# Patient Record
Sex: FEMALE | Race: WHITE | Employment: FULL TIME | URBAN - METROPOLITAN AREA
[De-identification: names, ages, dates, MRNs, and addresses within clinical notes are randomized per-mention and may not be internally consistent; named-entity substitution may affect disease eponyms.]

---

## 2017-04-05 PROCEDURE — 81001 URINALYSIS AUTO W/SCOPE: CPT | Performed by: INTERNAL MEDICINE

## 2017-04-05 PROCEDURE — 82043 UR ALBUMIN QUANTITATIVE: CPT | Performed by: INTERNAL MEDICINE

## 2017-04-05 PROCEDURE — 82570 ASSAY OF URINE CREATININE: CPT | Performed by: INTERNAL MEDICINE

## 2017-04-05 PROCEDURE — 87086 URINE CULTURE/COLONY COUNT: CPT | Performed by: INTERNAL MEDICINE

## 2017-04-27 PROBLEM — E66.01 MORBID OBESITY DUE TO EXCESS CALORIES (HCC): Status: ACTIVE | Noted: 2017-04-27

## 2018-02-19 PROCEDURE — 82043 UR ALBUMIN QUANTITATIVE: CPT | Performed by: INTERNAL MEDICINE

## 2018-02-19 PROCEDURE — 81003 URINALYSIS AUTO W/O SCOPE: CPT | Performed by: INTERNAL MEDICINE

## 2018-02-19 PROCEDURE — 82570 ASSAY OF URINE CREATININE: CPT | Performed by: INTERNAL MEDICINE

## 2018-06-07 PROCEDURE — 82785 ASSAY OF IGE: CPT | Performed by: INTERNAL MEDICINE

## 2018-06-18 PROBLEM — K21.9 LARYNGOPHARYNGEAL REFLUX (LPR): Status: ACTIVE | Noted: 2018-06-18

## 2018-07-09 PROCEDURE — 82570 ASSAY OF URINE CREATININE: CPT | Performed by: INTERNAL MEDICINE

## 2018-07-09 PROCEDURE — 81003 URINALYSIS AUTO W/O SCOPE: CPT | Performed by: INTERNAL MEDICINE

## 2018-07-09 PROCEDURE — 82043 UR ALBUMIN QUANTITATIVE: CPT | Performed by: INTERNAL MEDICINE

## 2019-01-16 PROCEDURE — 82570 ASSAY OF URINE CREATININE: CPT | Performed by: INTERNAL MEDICINE

## 2019-01-16 PROCEDURE — 82043 UR ALBUMIN QUANTITATIVE: CPT | Performed by: INTERNAL MEDICINE

## 2019-07-19 PROCEDURE — 87624 HPV HI-RISK TYP POOLED RSLT: CPT | Performed by: OBSTETRICS & GYNECOLOGY

## 2019-07-19 PROCEDURE — 88175 CYTOPATH C/V AUTO FLUID REDO: CPT | Performed by: OBSTETRICS & GYNECOLOGY

## 2020-05-18 PROBLEM — M25.552 HIP PAIN, ACUTE, LEFT: Status: ACTIVE | Noted: 2019-10-17

## 2020-05-26 NOTE — PAT NURSING NOTE
Spoke with [address] in Dr. Celso Felty office regarding abnormal Ua done 5-. Patient has an appt today with physician, will address at that time.

## 2020-05-29 NOTE — DISCHARGE SUMMARY
Ortho Discharge Summary     Patient ID:  Irving Arteaga  [id number]  39 year old  8/2/1965      Admit Date: 6/1/2020 10:19 AM    Discharge Date and Time: 6/2/2020  2:14 PM    Attending Physician: Vince Hardin MD     Reason for admission: left hi

## 2020-06-01 ENCOUNTER — HOSPITAL ENCOUNTER (OUTPATIENT)
Facility: HOSPITAL | Age: 55
Discharge: HOME | End: 2020-06-02
Attending: ORTHOPAEDIC SURGERY | Admitting: ORTHOPAEDIC SURGERY
Payer: COMMERCIAL

## 2020-06-01 ENCOUNTER — APPOINTMENT (OUTPATIENT)
Dept: GENERAL RADIOLOGY | Facility: HOSPITAL | Age: 55
End: 2020-06-01
Attending: PHYSICIAN ASSISTANT
Payer: COMMERCIAL

## 2020-06-01 ENCOUNTER — ANESTHESIA (OUTPATIENT)
Dept: SURGERY | Facility: HOSPITAL | Age: 55
End: 2020-06-01
Payer: COMMERCIAL

## 2020-06-01 ENCOUNTER — ANESTHESIA EVENT (OUTPATIENT)
Dept: SURGERY | Facility: HOSPITAL | Age: 55
End: 2020-06-01
Payer: COMMERCIAL

## 2020-06-01 PROBLEM — Z96.642 S/P HIP REPLACEMENT, LEFT: Status: ACTIVE | Noted: 2020-06-01

## 2020-06-01 PROBLEM — Z96.649 HISTORY OF HIP REPLACEMENT: Status: ACTIVE | Noted: 2020-06-01

## 2020-06-01 PROCEDURE — 97116 GAIT TRAINING THERAPY: CPT

## 2020-06-01 PROCEDURE — 86850 RBC ANTIBODY SCREEN: CPT | Performed by: ORTHOPAEDIC SURGERY

## 2020-06-01 PROCEDURE — 86901 BLOOD TYPING SEROLOGIC RH(D): CPT | Performed by: ORTHOPAEDIC SURGERY

## 2020-06-01 PROCEDURE — 97162 PT EVAL MOD COMPLEX 30 MIN: CPT

## 2020-06-01 PROCEDURE — 88311 DECALCIFY TISSUE: CPT | Performed by: ORTHOPAEDIC SURGERY

## 2020-06-01 PROCEDURE — 85018 HEMOGLOBIN: CPT | Performed by: PHYSICIAN ASSISTANT

## 2020-06-01 PROCEDURE — 82962 GLUCOSE BLOOD TEST: CPT

## 2020-06-01 PROCEDURE — 80053 COMPREHEN METABOLIC PANEL: CPT | Performed by: PHYSICIAN ASSISTANT

## 2020-06-01 PROCEDURE — 86900 BLOOD TYPING SEROLOGIC ABO: CPT | Performed by: ORTHOPAEDIC SURGERY

## 2020-06-01 PROCEDURE — 36415 COLL VENOUS BLD VENIPUNCTURE: CPT | Performed by: ORTHOPAEDIC SURGERY

## 2020-06-01 PROCEDURE — 88305 TISSUE EXAM BY PATHOLOGIST: CPT | Performed by: ORTHOPAEDIC SURGERY

## 2020-06-01 PROCEDURE — 85014 HEMATOCRIT: CPT | Performed by: PHYSICIAN ASSISTANT

## 2020-06-01 PROCEDURE — 0SRB0JA REPLACEMENT OF LEFT HIP JOINT WITH SYNTHETIC SUBSTITUTE, UNCEMENTED, OPEN APPROACH: ICD-10-PCS | Performed by: ORTHOPAEDIC SURGERY

## 2020-06-01 PROCEDURE — 73502 X-RAY EXAM HIP UNI 2-3 VIEWS: CPT | Performed by: PHYSICIAN ASSISTANT

## 2020-06-01 DEVICE — STEM FEM STNDOF 11MM STRL HIP: Type: IMPLANTABLE DEVICE | Site: HIP | Status: FUNCTIONAL

## 2020-06-01 DEVICE — SCREW BN 6.5MM 20MM CANC TI: Type: IMPLANTABLE DEVICE | Site: HIP | Status: FUNCTIONAL

## 2020-06-01 DEVICE — THA CAP, PRIMARY: Type: IMPLANTABLE DEVICE | Status: FUNCTIONAL

## 2020-06-01 DEVICE — SCREW BN 6.5MM 25MM CANC TI: Type: IMPLANTABLE DEVICE | Site: HIP | Status: FUNCTIONAL

## 2020-06-01 DEVICE — LINER ACET MP9 NEUT NLTX STRL: Type: IMPLANTABLE DEVICE | Site: HIP | Status: FUNCTIONAL

## 2020-06-01 DEVICE — SHELL ACET HEMI 56MM STRL HIP: Type: IMPLANTABLE DEVICE | Site: HIP | Status: FUNCTIONAL

## 2020-06-01 DEVICE — HEAD FEM +4MM 40MM HIP BIO: Type: IMPLANTABLE DEVICE | Site: HIP | Status: FUNCTIONAL

## 2020-06-01 DEVICE — SCREW BN 6.5MM 15MM CANC TI: Type: IMPLANTABLE DEVICE | Site: HIP | Status: FUNCTIONAL

## 2020-06-01 RX ORDER — MORPHINE SULFATE 4 MG/ML
2 INJECTION, SOLUTION INTRAMUSCULAR; INTRAVENOUS EVERY 10 MIN PRN
Status: DISCONTINUED | OUTPATIENT
Start: 2020-06-01 | End: 2020-06-01 | Stop reason: HOSPADM

## 2020-06-01 RX ORDER — MORPHINE SULFATE 4 MG/ML
4 INJECTION, SOLUTION INTRAMUSCULAR; INTRAVENOUS EVERY 10 MIN PRN
Status: DISCONTINUED | OUTPATIENT
Start: 2020-06-01 | End: 2020-06-01 | Stop reason: HOSPADM

## 2020-06-01 RX ORDER — SODIUM PHOSPHATE, DIBASIC AND SODIUM PHOSPHATE, MONOBASIC 7; 19 G/133ML; G/133ML
1 ENEMA RECTAL ONCE AS NEEDED
Status: DISCONTINUED | OUTPATIENT
Start: 2020-06-01 | End: 2020-06-02

## 2020-06-01 RX ORDER — PROCHLORPERAZINE EDISYLATE 5 MG/ML
10 INJECTION INTRAMUSCULAR; INTRAVENOUS EVERY 6 HOURS PRN
Status: DISCONTINUED | OUTPATIENT
Start: 2020-06-01 | End: 2020-06-02

## 2020-06-01 RX ORDER — FAMOTIDINE 20 MG/1
20 TABLET ORAL 2 TIMES DAILY
Status: DISCONTINUED | OUTPATIENT
Start: 2020-06-01 | End: 2020-06-02

## 2020-06-01 RX ORDER — IBUPROFEN 600 MG/1
600 TABLET ORAL EVERY 8 HOURS SCHEDULED
Status: DISCONTINUED | OUTPATIENT
Start: 2020-06-02 | End: 2020-06-02

## 2020-06-01 RX ORDER — MORPHINE SULFATE 10 MG/ML
6 INJECTION, SOLUTION INTRAMUSCULAR; INTRAVENOUS EVERY 10 MIN PRN
Status: DISCONTINUED | OUTPATIENT
Start: 2020-06-01 | End: 2020-06-01 | Stop reason: HOSPADM

## 2020-06-01 RX ORDER — TRAMADOL HYDROCHLORIDE 50 MG/1
TABLET ORAL EVERY 4 HOURS PRN
Qty: 60 TABLET | Refills: 0 | Status: SHIPPED | OUTPATIENT
Start: 2020-06-01 | End: 2020-06-11

## 2020-06-01 RX ORDER — TRAMADOL HYDROCHLORIDE 50 MG/1
50 TABLET ORAL EVERY 6 HOURS PRN
Status: DISCONTINUED | OUTPATIENT
Start: 2020-06-01 | End: 2020-06-02

## 2020-06-01 RX ORDER — IPRATROPIUM BROMIDE AND ALBUTEROL SULFATE 2.5; .5 MG/3ML; MG/3ML
3 SOLUTION RESPIRATORY (INHALATION) EVERY 6 HOURS PRN
Status: DISCONTINUED | OUTPATIENT
Start: 2020-06-01 | End: 2020-06-02

## 2020-06-01 RX ORDER — METOCLOPRAMIDE HYDROCHLORIDE 5 MG/ML
10 INJECTION INTRAMUSCULAR; INTRAVENOUS EVERY 6 HOURS PRN
Status: DISCONTINUED | OUTPATIENT
Start: 2020-06-01 | End: 2020-06-02

## 2020-06-01 RX ORDER — ONDANSETRON 4 MG/1
4 TABLET, FILM COATED ORAL EVERY 8 HOURS PRN
Qty: 30 TABLET | Refills: 0 | Status: ON HOLD | OUTPATIENT
Start: 2020-06-01 | End: 2020-10-20

## 2020-06-01 RX ORDER — FAMOTIDINE 20 MG/1
20 TABLET ORAL ONCE
Status: COMPLETED | OUTPATIENT
Start: 2020-06-01 | End: 2020-06-01

## 2020-06-01 RX ORDER — SODIUM CHLORIDE, SODIUM LACTATE, POTASSIUM CHLORIDE, CALCIUM CHLORIDE 600; 310; 30; 20 MG/100ML; MG/100ML; MG/100ML; MG/100ML
INJECTION, SOLUTION INTRAVENOUS CONTINUOUS
Status: DISCONTINUED | OUTPATIENT
Start: 2020-06-01 | End: 2020-06-01 | Stop reason: HOSPADM

## 2020-06-01 RX ORDER — OXYCODONE AND ACETAMINOPHEN 10; 325 MG/1; MG/1
1-2 TABLET ORAL EVERY 4 HOURS PRN
Status: DISCONTINUED | OUTPATIENT
Start: 2020-06-01 | End: 2020-06-01 | Stop reason: ALTCHOICE

## 2020-06-01 RX ORDER — OXYCODONE AND ACETAMINOPHEN 10; 325 MG/1; MG/1
2 TABLET ORAL EVERY 4 HOURS PRN
Status: DISCONTINUED | OUTPATIENT
Start: 2020-06-01 | End: 2020-06-02

## 2020-06-01 RX ORDER — KETOROLAC TROMETHAMINE 30 MG/ML
30 INJECTION, SOLUTION INTRAMUSCULAR; INTRAVENOUS EVERY 8 HOURS SCHEDULED
Status: DISPENSED | OUTPATIENT
Start: 2020-06-01 | End: 2020-06-02

## 2020-06-01 RX ORDER — MIDAZOLAM HYDROCHLORIDE 1 MG/ML
INJECTION INTRAMUSCULAR; INTRAVENOUS AS NEEDED
Status: DISCONTINUED | OUTPATIENT
Start: 2020-06-01 | End: 2020-06-01 | Stop reason: SURG

## 2020-06-01 RX ORDER — SODIUM CHLORIDE, SODIUM LACTATE, POTASSIUM CHLORIDE, CALCIUM CHLORIDE 600; 310; 30; 20 MG/100ML; MG/100ML; MG/100ML; MG/100ML
INJECTION, SOLUTION INTRAVENOUS CONTINUOUS
Status: DISCONTINUED | OUTPATIENT
Start: 2020-06-01 | End: 2020-06-02

## 2020-06-01 RX ORDER — ACETAMINOPHEN 500 MG
1000 TABLET ORAL ONCE
Status: COMPLETED | OUTPATIENT
Start: 2020-06-01 | End: 2020-06-01

## 2020-06-01 RX ORDER — OXYCODONE AND ACETAMINOPHEN 10; 325 MG/1; MG/1
1 TABLET ORAL EVERY 4 HOURS PRN
Status: DISCONTINUED | OUTPATIENT
Start: 2020-06-01 | End: 2020-06-02

## 2020-06-01 RX ORDER — BUPIVACAINE HYDROCHLORIDE 7.5 MG/ML
INJECTION, SOLUTION INTRASPINAL AS NEEDED
Status: DISCONTINUED | OUTPATIENT
Start: 2020-06-01 | End: 2020-06-01 | Stop reason: SURG

## 2020-06-01 RX ORDER — MONTELUKAST SODIUM 10 MG/1
10 TABLET ORAL NIGHTLY
Status: DISCONTINUED | OUTPATIENT
Start: 2020-06-01 | End: 2020-06-02

## 2020-06-01 RX ORDER — HYDROMORPHONE HYDROCHLORIDE 1 MG/ML
0.6 INJECTION, SOLUTION INTRAMUSCULAR; INTRAVENOUS; SUBCUTANEOUS EVERY 5 MIN PRN
Status: DISCONTINUED | OUTPATIENT
Start: 2020-06-01 | End: 2020-06-01 | Stop reason: HOSPADM

## 2020-06-01 RX ORDER — CEFADROXIL 500 MG/1
500 CAPSULE ORAL 2 TIMES DAILY
Qty: 14 CAPSULE | Refills: 0 | Status: SHIPPED | OUTPATIENT
Start: 2020-06-01 | End: 2020-06-08

## 2020-06-01 RX ORDER — CETIRIZINE HYDROCHLORIDE 10 MG/1
10 TABLET ORAL NIGHTLY
Status: DISCONTINUED | OUTPATIENT
Start: 2020-06-01 | End: 2020-06-02

## 2020-06-01 RX ORDER — FAMOTIDINE 10 MG/ML
20 INJECTION, SOLUTION INTRAVENOUS 2 TIMES DAILY
Status: DISCONTINUED | OUTPATIENT
Start: 2020-06-01 | End: 2020-06-02

## 2020-06-01 RX ORDER — ASPIRIN 325 MG
325 TABLET ORAL 2 TIMES DAILY
Status: DISCONTINUED | OUTPATIENT
Start: 2020-06-01 | End: 2020-06-02

## 2020-06-01 RX ORDER — PHENYLEPHRINE HCL 10 MG/ML
VIAL (ML) INJECTION AS NEEDED
Status: DISCONTINUED | OUTPATIENT
Start: 2020-06-01 | End: 2020-06-01 | Stop reason: SURG

## 2020-06-01 RX ORDER — FLUTICASONE PROPIONATE 50 MCG
2 SPRAY, SUSPENSION (ML) NASAL NIGHTLY
Status: DISCONTINUED | OUTPATIENT
Start: 2020-06-01 | End: 2020-06-02

## 2020-06-01 RX ORDER — DEXTROSE MONOHYDRATE 25 G/50ML
50 INJECTION, SOLUTION INTRAVENOUS
Status: DISCONTINUED | OUTPATIENT
Start: 2020-06-01 | End: 2020-06-02

## 2020-06-01 RX ORDER — OXYCODONE HCL 10 MG/1
10 TABLET, FILM COATED, EXTENDED RELEASE ORAL ONCE
Status: COMPLETED | OUTPATIENT
Start: 2020-06-01 | End: 2020-06-01

## 2020-06-01 RX ORDER — DOCUSATE SODIUM 100 MG/1
100 CAPSULE, LIQUID FILLED ORAL 2 TIMES DAILY
Qty: 60 CAPSULE | Refills: 2 | Status: ON HOLD | OUTPATIENT
Start: 2020-06-01 | End: 2020-10-20

## 2020-06-01 RX ORDER — HYDROMORPHONE HYDROCHLORIDE 1 MG/ML
0.2 INJECTION, SOLUTION INTRAMUSCULAR; INTRAVENOUS; SUBCUTANEOUS EVERY 5 MIN PRN
Status: DISCONTINUED | OUTPATIENT
Start: 2020-06-01 | End: 2020-06-01 | Stop reason: HOSPADM

## 2020-06-01 RX ORDER — GLYCOPYRROLATE 0.2 MG/ML
INJECTION, SOLUTION INTRAMUSCULAR; INTRAVENOUS AS NEEDED
Status: DISCONTINUED | OUTPATIENT
Start: 2020-06-01 | End: 2020-06-01 | Stop reason: SURG

## 2020-06-01 RX ORDER — IBUPROFEN 600 MG/1
600 TABLET ORAL EVERY 8 HOURS PRN
Qty: 90 TABLET | Refills: 2 | Status: SHIPPED | OUTPATIENT
Start: 2020-06-01 | End: 2021-09-21

## 2020-06-01 RX ORDER — OXYCODONE AND ACETAMINOPHEN 10; 325 MG/1; MG/1
1-2 TABLET ORAL EVERY 4 HOURS PRN
Qty: 60 TABLET | Refills: 0 | Status: SHIPPED | OUTPATIENT
Start: 2020-06-01 | End: 2021-01-21

## 2020-06-01 RX ORDER — METOCLOPRAMIDE 10 MG/1
10 TABLET ORAL ONCE
Status: COMPLETED | OUTPATIENT
Start: 2020-06-01 | End: 2020-06-01

## 2020-06-01 RX ORDER — HYDROMORPHONE HYDROCHLORIDE 1 MG/ML
0.8 INJECTION, SOLUTION INTRAMUSCULAR; INTRAVENOUS; SUBCUTANEOUS EVERY 2 HOUR PRN
Status: DISCONTINUED | OUTPATIENT
Start: 2020-06-01 | End: 2020-06-02

## 2020-06-01 RX ORDER — MAGNESIUM HYDROXIDE 1200 MG/15ML
LIQUID ORAL CONTINUOUS PRN
Status: COMPLETED | OUTPATIENT
Start: 2020-06-01 | End: 2020-06-01

## 2020-06-01 RX ORDER — DIPHENHYDRAMINE HYDROCHLORIDE 50 MG/ML
25 INJECTION INTRAMUSCULAR; INTRAVENOUS ONCE AS NEEDED
Status: ACTIVE | OUTPATIENT
Start: 2020-06-01 | End: 2020-06-01

## 2020-06-01 RX ORDER — SENNOSIDES 8.6 MG
17.2 TABLET ORAL NIGHTLY
Status: DISCONTINUED | OUTPATIENT
Start: 2020-06-01 | End: 2020-06-02

## 2020-06-01 RX ORDER — ATORVASTATIN CALCIUM 40 MG/1
40 TABLET, FILM COATED ORAL NIGHTLY
Status: DISCONTINUED | OUTPATIENT
Start: 2020-06-01 | End: 2020-06-02

## 2020-06-01 RX ORDER — HYDROCODONE BITARTRATE AND ACETAMINOPHEN 5; 325 MG/1; MG/1
2 TABLET ORAL AS NEEDED
Status: DISCONTINUED | OUTPATIENT
Start: 2020-06-01 | End: 2020-06-01 | Stop reason: HOSPADM

## 2020-06-01 RX ORDER — DEXTROSE MONOHYDRATE 25 G/50ML
50 INJECTION, SOLUTION INTRAVENOUS
Status: DISCONTINUED | OUTPATIENT
Start: 2020-06-01 | End: 2020-06-01 | Stop reason: HOSPADM

## 2020-06-01 RX ORDER — EPHEDRINE SULFATE 50 MG/ML
INJECTION, SOLUTION INTRAVENOUS AS NEEDED
Status: DISCONTINUED | OUTPATIENT
Start: 2020-06-01 | End: 2020-06-01 | Stop reason: SURG

## 2020-06-01 RX ORDER — ONDANSETRON 2 MG/ML
4 INJECTION INTRAMUSCULAR; INTRAVENOUS EVERY 4 HOURS PRN
Status: DISCONTINUED | OUTPATIENT
Start: 2020-06-01 | End: 2020-06-02

## 2020-06-01 RX ORDER — SCOLOPAMINE TRANSDERMAL SYSTEM 1 MG/1
1 PATCH, EXTENDED RELEASE TRANSDERMAL ONCE
Status: DISCONTINUED | OUTPATIENT
Start: 2020-06-01 | End: 2020-06-02

## 2020-06-01 RX ORDER — HYDROMORPHONE HYDROCHLORIDE 1 MG/ML
0.2 INJECTION, SOLUTION INTRAMUSCULAR; INTRAVENOUS; SUBCUTANEOUS EVERY 2 HOUR PRN
Status: DISCONTINUED | OUTPATIENT
Start: 2020-06-01 | End: 2020-06-02

## 2020-06-01 RX ORDER — TRANEXAMIC ACID 10 MG/ML
1000 INJECTION, SOLUTION INTRAVENOUS ONCE
Status: COMPLETED | OUTPATIENT
Start: 2020-06-01 | End: 2020-06-01

## 2020-06-01 RX ORDER — TRANEXAMIC ACID 10 MG/ML
INJECTION, SOLUTION INTRAVENOUS AS NEEDED
Status: DISCONTINUED | OUTPATIENT
Start: 2020-06-01 | End: 2020-06-01 | Stop reason: SURG

## 2020-06-01 RX ORDER — TRAMADOL HYDROCHLORIDE 50 MG/1
100 TABLET ORAL EVERY 6 HOURS PRN
Status: DISCONTINUED | OUTPATIENT
Start: 2020-06-01 | End: 2020-06-02

## 2020-06-01 RX ORDER — BISACODYL 10 MG
10 SUPPOSITORY, RECTAL RECTAL
Status: DISCONTINUED | OUTPATIENT
Start: 2020-06-01 | End: 2020-06-02

## 2020-06-01 RX ORDER — NALOXONE HYDROCHLORIDE 0.4 MG/ML
80 INJECTION, SOLUTION INTRAMUSCULAR; INTRAVENOUS; SUBCUTANEOUS AS NEEDED
Status: DISCONTINUED | OUTPATIENT
Start: 2020-06-01 | End: 2020-06-01 | Stop reason: HOSPADM

## 2020-06-01 RX ORDER — PANTOPRAZOLE SODIUM 40 MG/1
40 TABLET, DELAYED RELEASE ORAL
Qty: 30 TABLET | Refills: 0 | Status: SHIPPED | OUTPATIENT
Start: 2020-06-01 | End: 2021-03-22

## 2020-06-01 RX ORDER — ONDANSETRON 2 MG/ML
4 INJECTION INTRAMUSCULAR; INTRAVENOUS ONCE AS NEEDED
Status: DISCONTINUED | OUTPATIENT
Start: 2020-06-01 | End: 2020-06-01 | Stop reason: HOSPADM

## 2020-06-01 RX ORDER — PROCHLORPERAZINE EDISYLATE 5 MG/ML
5 INJECTION INTRAMUSCULAR; INTRAVENOUS ONCE AS NEEDED
Status: DISCONTINUED | OUTPATIENT
Start: 2020-06-01 | End: 2020-06-01 | Stop reason: HOSPADM

## 2020-06-01 RX ORDER — FENOFIBRATE 134 MG/1
134 CAPSULE ORAL
Status: DISCONTINUED | OUTPATIENT
Start: 2020-06-02 | End: 2020-06-02

## 2020-06-01 RX ORDER — ASPIRIN 325 MG
325 TABLET, DELAYED RELEASE (ENTERIC COATED) ORAL 2 TIMES DAILY
Qty: 60 TABLET | Refills: 0 | Status: SHIPPED | OUTPATIENT
Start: 2020-06-01 | End: 2020-07-01

## 2020-06-01 RX ORDER — PREGABALIN 75 MG/1
75 CAPSULE ORAL 2 TIMES DAILY
Status: DISCONTINUED | OUTPATIENT
Start: 2020-06-01 | End: 2020-06-02

## 2020-06-01 RX ORDER — DOCUSATE SODIUM 100 MG/1
100 CAPSULE, LIQUID FILLED ORAL 2 TIMES DAILY
Status: DISCONTINUED | OUTPATIENT
Start: 2020-06-01 | End: 2020-06-02

## 2020-06-01 RX ORDER — SODIUM CHLORIDE, SODIUM LACTATE, POTASSIUM CHLORIDE, CALCIUM CHLORIDE 600; 310; 30; 20 MG/100ML; MG/100ML; MG/100ML; MG/100ML
INJECTION, SOLUTION INTRAVENOUS CONTINUOUS
Status: DISCONTINUED | OUTPATIENT
Start: 2020-06-01 | End: 2020-06-01 | Stop reason: ALTCHOICE

## 2020-06-01 RX ORDER — POTASSIUM CHLORIDE 20 MEQ/1
40 TABLET, EXTENDED RELEASE ORAL ONCE
Status: COMPLETED | OUTPATIENT
Start: 2020-06-01 | End: 2020-06-01

## 2020-06-01 RX ORDER — HYDROMORPHONE HYDROCHLORIDE 1 MG/ML
0.4 INJECTION, SOLUTION INTRAMUSCULAR; INTRAVENOUS; SUBCUTANEOUS EVERY 5 MIN PRN
Status: DISCONTINUED | OUTPATIENT
Start: 2020-06-01 | End: 2020-06-01 | Stop reason: HOSPADM

## 2020-06-01 RX ORDER — CELECOXIB 200 MG/1
200 CAPSULE ORAL ONCE
Status: COMPLETED | OUTPATIENT
Start: 2020-06-01 | End: 2020-06-01

## 2020-06-01 RX ORDER — HYDROMORPHONE HYDROCHLORIDE 1 MG/ML
0.4 INJECTION, SOLUTION INTRAMUSCULAR; INTRAVENOUS; SUBCUTANEOUS EVERY 2 HOUR PRN
Status: DISCONTINUED | OUTPATIENT
Start: 2020-06-01 | End: 2020-06-02

## 2020-06-01 RX ORDER — CYCLOBENZAPRINE HCL 5 MG
5 TABLET ORAL EVERY 8 HOURS PRN
Status: DISCONTINUED | OUTPATIENT
Start: 2020-06-01 | End: 2020-06-02

## 2020-06-01 RX ORDER — DIPHENHYDRAMINE HCL 25 MG
25 CAPSULE ORAL EVERY 4 HOURS PRN
Status: DISCONTINUED | OUTPATIENT
Start: 2020-06-01 | End: 2020-06-02

## 2020-06-01 RX ORDER — HALOPERIDOL 5 MG/ML
0.25 INJECTION INTRAMUSCULAR ONCE AS NEEDED
Status: DISCONTINUED | OUTPATIENT
Start: 2020-06-01 | End: 2020-06-01 | Stop reason: HOSPADM

## 2020-06-01 RX ORDER — POLYETHYLENE GLYCOL 3350 17 G/17G
17 POWDER, FOR SOLUTION ORAL DAILY PRN
Status: DISCONTINUED | OUTPATIENT
Start: 2020-06-01 | End: 2020-06-02

## 2020-06-01 RX ORDER — DIPHENHYDRAMINE HYDROCHLORIDE 50 MG/ML
12.5 INJECTION INTRAMUSCULAR; INTRAVENOUS EVERY 4 HOURS PRN
Status: DISCONTINUED | OUTPATIENT
Start: 2020-06-01 | End: 2020-06-02

## 2020-06-01 RX ORDER — HYDROCODONE BITARTRATE AND ACETAMINOPHEN 5; 325 MG/1; MG/1
1 TABLET ORAL AS NEEDED
Status: DISCONTINUED | OUTPATIENT
Start: 2020-06-01 | End: 2020-06-01 | Stop reason: HOSPADM

## 2020-06-01 RX ADMIN — SODIUM CHLORIDE, SODIUM LACTATE, POTASSIUM CHLORIDE, CALCIUM CHLORIDE: 600; 310; 30; 20 INJECTION, SOLUTION INTRAVENOUS at 13:48:00

## 2020-06-01 RX ADMIN — EPHEDRINE SULFATE 10 MG: 50 INJECTION, SOLUTION INTRAVENOUS at 13:39:00

## 2020-06-01 RX ADMIN — PHENYLEPHRINE HCL 100 MCG: 10 MG/ML VIAL (ML) INJECTION at 12:54:00

## 2020-06-01 RX ADMIN — PHENYLEPHRINE HCL 100 MCG: 10 MG/ML VIAL (ML) INJECTION at 13:36:00

## 2020-06-01 RX ADMIN — PHENYLEPHRINE HCL 100 MCG: 10 MG/ML VIAL (ML) INJECTION at 13:05:00

## 2020-06-01 RX ADMIN — SODIUM CHLORIDE, SODIUM LACTATE, POTASSIUM CHLORIDE, CALCIUM CHLORIDE: 600; 310; 30; 20 INJECTION, SOLUTION INTRAVENOUS at 12:18:00

## 2020-06-01 RX ADMIN — PHENYLEPHRINE HCL 100 MCG: 10 MG/ML VIAL (ML) INJECTION at 13:50:00

## 2020-06-01 RX ADMIN — MIDAZOLAM HYDROCHLORIDE 2 MG: 1 INJECTION INTRAMUSCULAR; INTRAVENOUS at 12:21:00

## 2020-06-01 RX ADMIN — TRANEXAMIC ACID 1000 MG: 10 INJECTION, SOLUTION INTRAVENOUS at 13:05:00

## 2020-06-01 RX ADMIN — SODIUM CHLORIDE, SODIUM LACTATE, POTASSIUM CHLORIDE, CALCIUM CHLORIDE: 600; 310; 30; 20 INJECTION, SOLUTION INTRAVENOUS at 12:59:00

## 2020-06-01 RX ADMIN — PHENYLEPHRINE HCL 50 MCG: 10 MG/ML VIAL (ML) INJECTION at 13:24:00

## 2020-06-01 RX ADMIN — GLYCOPYRROLATE 0.2 MG: 0.2 INJECTION, SOLUTION INTRAMUSCULAR; INTRAVENOUS at 13:31:00

## 2020-06-01 RX ADMIN — PHENYLEPHRINE HCL 100 MCG: 10 MG/ML VIAL (ML) INJECTION at 13:28:00

## 2020-06-01 RX ADMIN — PHENYLEPHRINE HCL 100 MCG: 10 MG/ML VIAL (ML) INJECTION at 13:14:00

## 2020-06-01 RX ADMIN — BUPIVACAINE HYDROCHLORIDE 1.6 ML: 7.5 INJECTION, SOLUTION INTRASPINAL at 12:29:00

## 2020-06-01 RX ADMIN — PHENYLEPHRINE HCL 100 MCG: 10 MG/ML VIAL (ML) INJECTION at 13:43:00

## 2020-06-01 RX ADMIN — SODIUM CHLORIDE, SODIUM LACTATE, POTASSIUM CHLORIDE, CALCIUM CHLORIDE: 600; 310; 30; 20 INJECTION, SOLUTION INTRAVENOUS at 14:49:00

## 2020-06-01 RX ADMIN — SODIUM CHLORIDE, SODIUM LACTATE, POTASSIUM CHLORIDE, CALCIUM CHLORIDE: 600; 310; 30; 20 INJECTION, SOLUTION INTRAVENOUS at 13:49:00

## 2020-06-01 NOTE — PHYSICAL THERAPY NOTE
PHYSICAL THERAPY HIP EVALUATION - INPATIENT     Room Number: 403/403-A  Evaluation Date: 6/1/2020  Type of Evaluation: Initial  Physician Order: PT Eval and Treat    Presenting Problem: s/p left THR, posterior precautions, WBAT  Reason for Therapy: Celine Aviles mobility. She needs min A for sit to stand, min A for gait 40' with RW and transfers bed to/from chair with RW and cues for taking steps and not pivoting on left hip. Will benefit from further training prior to d/c home.  All questions answered and RN aware  who is available to help her. She had to adapt home since March when she injured the hip. She has a comfort height toilet and the sink is next to it so she can get up independently.  She was walking with a RW and did have a w/c to get in/out of the sitting on the side of the bed?: A Lot   How much help from another person does the patient currently need. ..   -   Moving to and from a bed to a chair (including a wheelchair)?: A Little   -   Need to walk in hospital room?: A [address] [place] [address] 3-5 st #4 Patient will negotiate one 3.5\" curb w/ assistive device and minimal assistance   Goal #4   Current Status    Goal #5 Patient verbalizes and/or demonstrates all precautions and safety concerns independently   Goal #5   Current Status    Goal #6 Patient

## 2020-06-01 NOTE — PLAN OF CARE
Problem: SKIN/TISSUE INTEGRITY - ADULT  Goal: Incision(s), wounds(s) or drain site(s) healing without S/S of infection  Description  INTERVENTIONS:  - Assess and document risk factors for pressure ulcer development  - Assess and document skin integrity appropriate pain scale  - Administer analgesics based on type and severity of pain and evaluate response  - Implement non-pharmacological measures as appropriate and evaluate response  - Consider cultural and social influences on pain and pain management order or complex needs related to functional status, cognitive ability or social support system  Outcome: Progressing       Pt a/o x3. On RA. Voiding freely. Up with one assist and RW. Posterior hip precautions, has the wedge between her legs.  LR running a

## 2020-06-01 NOTE — H&P
[facility] Hospitalist Team  History and Physical  Admit Date:  6/1/20    ASSESSMENT / PLAN:   52 yo female with morbid obesity-BMI 48, chronic pain, asthma,rhinitis, pre DM,  HL, HTN amd OA who is S/P Left Hip Arthroplasty, see below for details     S/P Left Hip Location: Right arm)   Pulse 79   Temp 97.4 °F (36.3 °C) (Oral)   Resp 10   Ht 5' 5\" (1.651 m)   Wt (!) 318 lb (144.2 kg)   SpO2 100%   BMI 52.92 kg/m²     GENERAL: no apparent distress, overweight  NEUROLOGIC: A/A;  Ox3  SKIN: no rashes  HEENT: normocepha total) by mouth every 4 (four) hours as needed for Pain., Disp: 60 tablet, Rfl: 0  oxyCODONE-acetaminophen  MG Oral Tab, Take 1-2 tablets by mouth every 4 (four) hours as needed for Pain., Disp: 60 tablet, Rfl: 0  PREGABALIN 75 MG Oral Cap, TAKE ONE the the HPI. DIAGNOSTIC DATA:   SEE CHART    SEE ATTENDING NOTE BELOW      Patient seen and examined independently. Discussed with APN and agree with note above.       HPI  52 yo female with morbid obesity-BMI 48, chronic pain, asthma,rhinitis, pre DM

## 2020-06-01 NOTE — ANESTHESIA PREPROCEDURE EVALUATION
Anesthesia PreOp Note    HPI:     Lm South is a 53year old female who presents for preoperative consultation requested by: Gladis Joel MD    Date of Surgery: 6/1/2020    Procedure(s):  HIP TOTAL REPLACEMENT  Indication: left hip degenera 90 tablet, Rfl: 0, 5/30/2020  FENOFIBRATE 145 MG Oral Tab, TAKE 1 TABLET BY MOUTH DAILY, Disp: 90 tablet, Rfl: 0, 5/30/2020  Albuterol Sulfate HFA (PROAIR HFA) 108 (90 Base) MCG/ACT Inhalation Aero Soln, INHALE TWO PUFFS BY MOUTH EVERY FOUR TO SIX HOURS AS DIRECTED, Disp: , Rfl: 0, Taking      lactated ringers infusion, , Intravenous, Continuous, SporerLaurence MD  scopolamine (TRANSDERM-SCOP) patch, 1 patch, Transdermal, Once, Eulalia Healy MD, 1 patch at 06/01/20 1122  ceFAZolin in NS (ANCEF) 3g/100mL IVP Inability: Not on file      Transportation needs:        Medical: Not on file        Non-medical: Not on file    Tobacco Use      Smoking status: Never Smoker      Smokeless tobacco: Never Used    Substance and Sexual Activity      Alcohol use:  Yes 73    Resp:  16    Temp:  98.5 °F (36.9 °C)    TempSrc:  Axillary    Weight: 133.8 kg (295 lb)  (!) 144.2 kg (318 lb)   Height: 1.651 m (5' 5\")          Anesthesia Evaluation      History of anesthetic complications (ponv. denies history of difficult intu

## 2020-06-01 NOTE — H&P
History and Physical: Brief Update    I have reviewed the history and physical performed within the last 30 days, Dr. Mikki Snow 5/26/2020. I agree with this assessment and have no further additions. The consent form is signed and present in the chart.   The

## 2020-06-01 NOTE — ANESTHESIA PROCEDURE NOTES
Spinal Block  Performed by: Aggie Zepeda MD  Authorized by: Aggie Zepeda MD       General Information and Staff    Start Time:  6/1/2020 12:20 PM  End Time:  6/1/2020 12:30 PM  Anesthesiologist:  Aggie Zepeda MD  Performed by:   Anesthesiologist  P

## 2020-06-01 NOTE — ANESTHESIA POSTPROCEDURE EVALUATION
Patient: Olga Mathias    Procedure Summary     Date:  06/01/20 Room / Location:  [facility] MAIN OR [address] [place] [address] MAIN OR    Anesthesia Start:  9896 Anesthesia Stop:  6738    Procedure:  HIP TOTAL REPLACEMENT (Left ) Diagnosis:  (left hip degenerative joint

## 2020-06-02 VITALS
HEIGHT: 65 IN | TEMPERATURE: 99 F | WEIGHT: 293 LBS | OXYGEN SATURATION: 97 % | BODY MASS INDEX: 48.82 KG/M2 | DIASTOLIC BLOOD PRESSURE: 69 MMHG | SYSTOLIC BLOOD PRESSURE: 132 MMHG | HEART RATE: 77 BPM | RESPIRATION RATE: 18 BRPM

## 2020-06-02 PROBLEM — Z96.649 HISTORY OF HIP REPLACEMENT: Status: RESOLVED | Noted: 2020-06-01 | Resolved: 2020-06-02

## 2020-06-02 PROBLEM — M25.552 HIP PAIN, ACUTE, LEFT: Status: RESOLVED | Noted: 2019-10-17 | Resolved: 2020-06-02

## 2020-06-02 PROCEDURE — 97530 THERAPEUTIC ACTIVITIES: CPT

## 2020-06-02 PROCEDURE — 97535 SELF CARE MNGMENT TRAINING: CPT

## 2020-06-02 PROCEDURE — 94640 AIRWAY INHALATION TREATMENT: CPT

## 2020-06-02 PROCEDURE — 82962 GLUCOSE BLOOD TEST: CPT

## 2020-06-02 PROCEDURE — 97116 GAIT TRAINING THERAPY: CPT

## 2020-06-02 PROCEDURE — 97110 THERAPEUTIC EXERCISES: CPT

## 2020-06-02 PROCEDURE — 97166 OT EVAL MOD COMPLEX 45 MIN: CPT

## 2020-06-02 PROCEDURE — 84132 ASSAY OF SERUM POTASSIUM: CPT | Performed by: INTERNAL MEDICINE

## 2020-06-02 RX ORDER — LISINOPRIL 10 MG/1
10 TABLET ORAL DAILY
Status: DISCONTINUED | OUTPATIENT
Start: 2020-06-02 | End: 2020-06-02

## 2020-06-02 NOTE — PROGRESS NOTES
[facility]    Progress Note    Irina Kenyon Patient Status:  Outpatient in a Bed    1965 MRN [id number]   Location [facility] 4W/SW/SE Attending Ernie Velázquez MD   Hosp Day # 0 PCP MD Ralph Bueno

## 2020-06-02 NOTE — PLAN OF CARE
Problem: SKIN/TISSUE INTEGRITY - ADULT  Goal: Incision(s), wounds(s) or drain site(s) healing without S/S of infection  Description  INTERVENTIONS:  - Assess and document risk factors for pressure ulcer development  - Assess and document skin integrity request assistance  - Assess pain using appropriate pain scale  - Administer analgesics based on type and severity of pain and evaluate response  - Implement non-pharmacological measures as appropriate and evaluate response  - Consider cultural and social resources and transportation as appropriate  - Identify discharge learning needs (meds, wound care, etc)  - Arrange for interpreters to assist at discharge as needed  - Consider post-discharge preferences of patient/family/discharge partner  - Complete TARAH

## 2020-06-02 NOTE — OCCUPATIONAL THERAPY NOTE
OCCUPATIONAL THERAPY EVALUATION - INPATIENT     Room Number: 403/403-A  Evaluation Date: 6/2/2020  Type of Evaluation: Initial  Presenting Problem: (L guido)    Physician Order: IP Consult to Occupational Therapy  Reason for Therapy: ADL/IADL Dysfunction a Home;Intermittent Supervision  OT Device Recommendations: None    PLAN    Patient has been evaluated and presents with no skilled Occupational Therapy needs  at this time. Patient will be discharged from Occupational Therapy services.  Please re-order if a BEARING RESTRICTION  L Lower Extremity: Weight Bearing as Tolerated    PAIN ASSESSMENT  Rating: (pt did not rate pain)  Location: (L thigh/hip)  Management Techniques:  Activity promotion;Relaxation;Repositioning    ACTIVITY TOLERANCE  Good    COGNITION  Al session/findings; All patient questions and concerns addressed(up sitting eob)

## 2020-06-02 NOTE — PHYSICAL THERAPY NOTE
PHYSICAL THERAPY HIP TREATMENT NOTE - INPATIENT    Room Number: 403/403-A            Presenting Problem: s/p left THR, posterior precautions, WBAT    Problem List  Principal Problem:    S/P hip replacement, left      PHYSICAL THERAPY ASSESSMENT     Patient Sitting    O2 WALK  SPO2 on Room Air at Rest: 98               AM-PAC '6-Clicks' [address]  How much difficulty does the patient currently have. ..  -   Turning over in bed (including adjusting bedclothes, sheets and blankets)?: at assistance level: modified independent     Goal #2  Current Status CGA   Goal #3 Patient is able to ambulate 75 feet with assistive device at assistance level: supervision   Goal #3   Current Status 60 ft with RW CGA   Goal #4 Patient will negotiate one

## 2020-06-02 NOTE — OPERATIVE REPORT
[address] [address] [address]  OPERATIVE REPORT  PATIENT NAME: Jerel Ortiz  MR#: [id number]  ATTENDING MD: Lazaro Ojeda  DATE OF PROCEDURE: 6/1/2020  PREOPERATIVE DIAGNOSIS: Degenerative Arthritis Lef acetabular and femoral component was able to be placed in stable position. PROCEDURE: The patient was admitted to the same day surgery program on Monday, June 1, 2020.  Following proper identification in the preoperative holding area with confirmation of t Hemispherical Shell acetabular component was placed in 45 degrees of verticality and 20 degrees of anteversion. This was seated and gave excellent initial stability. 2 Screws were used. Subsequently, a 56/40 polyethylene liner was inserted.   Attention was significant morbid obesity the surgical procedure was longer than our standard procedure. Special retractors were required for surgery and the surgical difficulty was tremendously increased due to the patients' morbid obesity.  Additional surgical drapes we

## 2020-06-02 NOTE — CM/SW NOTE
SW received MDO for surgery. Pt has been screened per chart review and during conversation with the RN. Pt has no identified needs at this time. Pt is planning on doing Outpt PT. SW/CM will remain available for support and any discharge needs.      PLAN: Siri Khoury

## 2020-06-02 NOTE — DISCHARGE SUMMARY
[facility] Hospitalist Discharge Summary   Patient ID:  Ajay Luciano  [id number]  66 year old  8/2/1965    Admit date: 6/1/2020  Discharge date: 6/2/2020    Primary Care Physician: Gunjan Barroso MD   Attending Physician: Fabi Salinas MD   Consults: endeavors as outpt  -outpt YULISSA screen      EXAM:   GENERAL: no apparent distress, overweight  NEURO: A/A Ox3  RESP: non labored, CTA  CARDIO: Regular, no murmur  ABD: soft, NT, ND, BS+  EXTREMITIES: left hip dressing     Operative Procedures: Procedure(s) MM Misc  Generic drug:  Insulin Pen Needle     Nebulizer Compressor Kit  PLEASE DISPENSE ONE NEB MACHINE WITH TUBING AND ONE ADULT MOUTHPIECE     pregabalin 75 MG Caps  Commonly known as:  LYRICA  TAKE ONE CAPSULE BY MOUTH TWICE DAILY        STOP taking th ORTHOPEDIC  Contact information:  [address] [place] [address] [phone]                   Disposition: home  Discharged Condition: stable    Total Time Coordinating Care: greater  than 30 minutes    Patient had opportunity to ask

## 2020-06-02 NOTE — PLAN OF CARE
Problem: SKIN/TISSUE INTEGRITY - ADULT  Goal: Incision(s), wounds(s) or drain site(s) healing without S/S of infection  Description  INTERVENTIONS:  - Assess and document risk factors for pressure ulcer development  - Assess and document skin integrity appropriate pain scale  - Administer analgesics based on type and severity of pain and evaluate response  - Implement non-pharmacological measures as appropriate and evaluate response  - Consider cultural and social influences on pain and pain management order or complex needs related to functional status, cognitive ability or social support system  Outcome: Progressing     Problem: Diabetes/Glucose Control  Goal: Glucose maintained within prescribed range  Description  INTERVENTIONS:  - Monitor Blood Gluc

## 2020-06-02 NOTE — PLAN OF CARE
Problem: Patient/Family Goals  Goal: Patient/Family Long Term Goal  Description  Patient's Long Term Goal:    Interventions:  -   - See additional Care Plan goals for specific interventions  Outcome: Adequate for Discharge  Goal: Patient/Family Short Ter precautions (e.g. spinal or hip dislocation precautions)  6/2/2020 1227 by Karey Calabrese RN  Outcome: Adequate for Discharge  6/2/2020 0932 by Karey Calabrese RN  Outcome: Progressing     Problem: Impaired Functional Mobility  Goal: Achieve highest/safest of injury  - Provide assistive devices as appropriate  - Consider OT/PT consult to assist with strengthening/mobility  - Encourage toileting schedule  6/2/2020 1227 by Devon Sanford, RN  Outcome: Adequate for Discharge  6/2/2020 0932 by Devon Sanford, RN instructions provided by surgeon. All questions answered.

## 2020-10-14 PROBLEM — M16.11 OSTEOARTHRITIS OF RIGHT HIP: Status: ACTIVE | Noted: 2020-10-14

## 2020-10-14 NOTE — DISCHARGE SUMMARY
Ortho Discharge Summary     Patient ID:  Elizabeth Cronin  [id number]  78 year old  8/2/1965      Admit Date: 10/20/2020 10:17 AM    Discharge Date and Time: 10/21/2020  3:10 PM    Attending Physician: Kellie Gonzalez MD     Reason for admission: rig

## 2020-10-17 ENCOUNTER — LAB ENCOUNTER (OUTPATIENT)
Dept: LAB | Facility: HOSPITAL | Age: 55
End: 2020-10-17
Attending: ORTHOPAEDIC SURGERY
Payer: COMMERCIAL

## 2020-10-17 DIAGNOSIS — Z01.818 PREOP TESTING: ICD-10-CM

## 2020-10-17 PROCEDURE — 86850 RBC ANTIBODY SCREEN: CPT

## 2020-10-17 PROCEDURE — 86901 BLOOD TYPING SEROLOGIC RH(D): CPT

## 2020-10-17 PROCEDURE — 86900 BLOOD TYPING SEROLOGIC ABO: CPT

## 2020-10-20 ENCOUNTER — ANESTHESIA EVENT (OUTPATIENT)
Dept: SURGERY | Facility: HOSPITAL | Age: 55
End: 2020-10-20
Payer: COMMERCIAL

## 2020-10-20 ENCOUNTER — HOSPITAL ENCOUNTER (OUTPATIENT)
Facility: HOSPITAL | Age: 55
Discharge: HOME | End: 2020-10-21
Attending: ORTHOPAEDIC SURGERY | Admitting: ORTHOPAEDIC SURGERY
Payer: COMMERCIAL

## 2020-10-20 ENCOUNTER — ANESTHESIA (OUTPATIENT)
Dept: SURGERY | Facility: HOSPITAL | Age: 55
End: 2020-10-20
Payer: COMMERCIAL

## 2020-10-20 ENCOUNTER — APPOINTMENT (OUTPATIENT)
Dept: GENERAL RADIOLOGY | Facility: HOSPITAL | Age: 55
End: 2020-10-20
Attending: PHYSICIAN ASSISTANT
Payer: COMMERCIAL

## 2020-10-20 DIAGNOSIS — Z01.818 PREOP TESTING: Primary | ICD-10-CM

## 2020-10-20 PROBLEM — Z96.649 HISTORY OF HIP REPLACEMENT: Status: ACTIVE | Noted: 2020-10-20

## 2020-10-20 PROCEDURE — 82962 GLUCOSE BLOOD TEST: CPT

## 2020-10-20 PROCEDURE — 85018 HEMOGLOBIN: CPT | Performed by: PHYSICIAN ASSISTANT

## 2020-10-20 PROCEDURE — 88311 DECALCIFY TISSUE: CPT | Performed by: ORTHOPAEDIC SURGERY

## 2020-10-20 PROCEDURE — 80053 COMPREHEN METABOLIC PANEL: CPT | Performed by: PHYSICIAN ASSISTANT

## 2020-10-20 PROCEDURE — 73502 X-RAY EXAM HIP UNI 2-3 VIEWS: CPT | Performed by: PHYSICIAN ASSISTANT

## 2020-10-20 PROCEDURE — 85014 HEMATOCRIT: CPT | Performed by: PHYSICIAN ASSISTANT

## 2020-10-20 PROCEDURE — 88305 TISSUE EXAM BY PATHOLOGIST: CPT | Performed by: ORTHOPAEDIC SURGERY

## 2020-10-20 PROCEDURE — 0SR90JA REPLACEMENT OF RIGHT HIP JOINT WITH SYNTHETIC SUBSTITUTE, UNCEMENTED, OPEN APPROACH: ICD-10-PCS | Performed by: ORTHOPAEDIC SURGERY

## 2020-10-20 DEVICE — THA CAP, PRIMARY: Type: IMPLANTABLE DEVICE | Status: FUNCTIONAL

## 2020-10-20 DEVICE — SHELL ACET HEMI 56MM STRL HIP: Type: IMPLANTABLE DEVICE | Site: HIP | Status: FUNCTIONAL

## 2020-10-20 DEVICE — LINER ACET MP9 NEUT NLTX STRL: Type: IMPLANTABLE DEVICE | Site: HIP | Status: FUNCTIONAL

## 2020-10-20 DEVICE — SCREW BN 6.5MM 15MM CANC TI: Type: IMPLANTABLE DEVICE | Site: HIP | Status: FUNCTIONAL

## 2020-10-20 DEVICE — STEM FEM STNDOF 11MM STRL HIP: Type: IMPLANTABLE DEVICE | Site: HIP | Status: FUNCTIONAL

## 2020-10-20 DEVICE — SCREW BN 6.5MM 30MM CANC TI: Type: IMPLANTABLE DEVICE | Site: HIP | Status: FUNCTIONAL

## 2020-10-20 DEVICE — HEAD FEM NEUT 40MM HIP BIO: Type: IMPLANTABLE DEVICE | Site: HIP | Status: FUNCTIONAL

## 2020-10-20 RX ORDER — DIPHENHYDRAMINE HYDROCHLORIDE 50 MG/ML
25 INJECTION INTRAMUSCULAR; INTRAVENOUS ONCE AS NEEDED
Status: ACTIVE | OUTPATIENT
Start: 2020-10-20 | End: 2020-10-20

## 2020-10-20 RX ORDER — HYDROMORPHONE HYDROCHLORIDE 1 MG/ML
0.6 INJECTION, SOLUTION INTRAMUSCULAR; INTRAVENOUS; SUBCUTANEOUS EVERY 5 MIN PRN
Status: DISCONTINUED | OUTPATIENT
Start: 2020-10-20 | End: 2020-10-20 | Stop reason: HOSPADM

## 2020-10-20 RX ORDER — ONDANSETRON 2 MG/ML
4 INJECTION INTRAMUSCULAR; INTRAVENOUS ONCE AS NEEDED
Status: DISCONTINUED | OUTPATIENT
Start: 2020-10-20 | End: 2020-10-20 | Stop reason: HOSPADM

## 2020-10-20 RX ORDER — PROCHLORPERAZINE EDISYLATE 5 MG/ML
5 INJECTION INTRAMUSCULAR; INTRAVENOUS ONCE AS NEEDED
Status: DISCONTINUED | OUTPATIENT
Start: 2020-10-20 | End: 2020-10-20 | Stop reason: HOSPADM

## 2020-10-20 RX ORDER — HYDROMORPHONE HYDROCHLORIDE 1 MG/ML
0.2 INJECTION, SOLUTION INTRAMUSCULAR; INTRAVENOUS; SUBCUTANEOUS EVERY 2 HOUR PRN
Status: DISCONTINUED | OUTPATIENT
Start: 2020-10-20 | End: 2020-10-21

## 2020-10-20 RX ORDER — FAMOTIDINE 20 MG/1
20 TABLET ORAL 2 TIMES DAILY
Status: DISCONTINUED | OUTPATIENT
Start: 2020-10-20 | End: 2020-10-21

## 2020-10-20 RX ORDER — BUPIVACAINE HYDROCHLORIDE 7.5 MG/ML
INJECTION, SOLUTION INTRASPINAL AS NEEDED
Status: DISCONTINUED | OUTPATIENT
Start: 2020-10-20 | End: 2020-10-20 | Stop reason: SURG

## 2020-10-20 RX ORDER — HYDROCODONE BITARTRATE AND ACETAMINOPHEN 5; 325 MG/1; MG/1
1 TABLET ORAL AS NEEDED
Status: DISCONTINUED | OUTPATIENT
Start: 2020-10-20 | End: 2020-10-20 | Stop reason: HOSPADM

## 2020-10-20 RX ORDER — POLYETHYLENE GLYCOL 3350 17 G/17G
17 POWDER, FOR SOLUTION ORAL DAILY PRN
Status: DISCONTINUED | OUTPATIENT
Start: 2020-10-20 | End: 2020-10-21

## 2020-10-20 RX ORDER — DEXTROSE MONOHYDRATE 25 G/50ML
50 INJECTION, SOLUTION INTRAVENOUS
Status: DISCONTINUED | OUTPATIENT
Start: 2020-10-20 | End: 2020-10-21

## 2020-10-20 RX ORDER — DOCUSATE SODIUM 100 MG/1
100 CAPSULE, LIQUID FILLED ORAL 2 TIMES DAILY
Qty: 60 CAPSULE | Refills: 2 | Status: SHIPPED | OUTPATIENT
Start: 2020-10-20 | End: 2020-10-21

## 2020-10-20 RX ORDER — DIPHENHYDRAMINE HYDROCHLORIDE 50 MG/ML
12.5 INJECTION INTRAMUSCULAR; INTRAVENOUS EVERY 4 HOURS PRN
Status: DISCONTINUED | OUTPATIENT
Start: 2020-10-20 | End: 2020-10-21

## 2020-10-20 RX ORDER — SODIUM CHLORIDE 9 MG/ML
INJECTION, SOLUTION INTRAVENOUS CONTINUOUS PRN
Status: DISCONTINUED | OUTPATIENT
Start: 2020-10-20 | End: 2020-10-20 | Stop reason: SURG

## 2020-10-20 RX ORDER — HYDROMORPHONE HYDROCHLORIDE 1 MG/ML
0.4 INJECTION, SOLUTION INTRAMUSCULAR; INTRAVENOUS; SUBCUTANEOUS EVERY 2 HOUR PRN
Status: DISCONTINUED | OUTPATIENT
Start: 2020-10-20 | End: 2020-10-21

## 2020-10-20 RX ORDER — ASPIRIN 325 MG
325 TABLET, DELAYED RELEASE (ENTERIC COATED) ORAL 2 TIMES DAILY
Qty: 60 TABLET | Refills: 0 | Status: SHIPPED | OUTPATIENT
Start: 2020-10-20 | End: 2020-10-21

## 2020-10-20 RX ORDER — HYDROMORPHONE HYDROCHLORIDE 1 MG/ML
0.8 INJECTION, SOLUTION INTRAMUSCULAR; INTRAVENOUS; SUBCUTANEOUS EVERY 2 HOUR PRN
Status: DISCONTINUED | OUTPATIENT
Start: 2020-10-20 | End: 2020-10-21

## 2020-10-20 RX ORDER — CELECOXIB 200 MG/1
200 CAPSULE ORAL ONCE
Status: COMPLETED | OUTPATIENT
Start: 2020-10-20 | End: 2020-10-20

## 2020-10-20 RX ORDER — MORPHINE SULFATE 10 MG/ML
6 INJECTION, SOLUTION INTRAMUSCULAR; INTRAVENOUS EVERY 10 MIN PRN
Status: DISCONTINUED | OUTPATIENT
Start: 2020-10-20 | End: 2020-10-20 | Stop reason: HOSPADM

## 2020-10-20 RX ORDER — METOCLOPRAMIDE 10 MG/1
10 TABLET ORAL ONCE
Status: COMPLETED | OUTPATIENT
Start: 2020-10-20 | End: 2020-10-20

## 2020-10-20 RX ORDER — ASPIRIN 325 MG
325 TABLET ORAL 2 TIMES DAILY
Status: DISCONTINUED | OUTPATIENT
Start: 2020-10-20 | End: 2020-10-21

## 2020-10-20 RX ORDER — SODIUM CHLORIDE, SODIUM LACTATE, POTASSIUM CHLORIDE, CALCIUM CHLORIDE 600; 310; 30; 20 MG/100ML; MG/100ML; MG/100ML; MG/100ML
INJECTION, SOLUTION INTRAVENOUS CONTINUOUS
Status: DISCONTINUED | OUTPATIENT
Start: 2020-10-20 | End: 2020-10-20 | Stop reason: HOSPADM

## 2020-10-20 RX ORDER — SCOLOPAMINE TRANSDERMAL SYSTEM 1 MG/1
1 PATCH, EXTENDED RELEASE TRANSDERMAL ONCE
Status: DISCONTINUED | OUTPATIENT
Start: 2020-10-20 | End: 2020-10-21

## 2020-10-20 RX ORDER — MAGNESIUM HYDROXIDE 1200 MG/15ML
LIQUID ORAL CONTINUOUS PRN
Status: DISCONTINUED | OUTPATIENT
Start: 2020-10-20 | End: 2020-10-20 | Stop reason: HOSPADM

## 2020-10-20 RX ORDER — ATORVASTATIN CALCIUM 40 MG/1
40 TABLET, FILM COATED ORAL NIGHTLY
Status: DISCONTINUED | OUTPATIENT
Start: 2020-10-20 | End: 2020-10-21

## 2020-10-20 RX ORDER — DEXTROSE MONOHYDRATE 25 G/50ML
50 INJECTION, SOLUTION INTRAVENOUS
Status: DISCONTINUED | OUTPATIENT
Start: 2020-10-20 | End: 2020-10-20 | Stop reason: HOSPADM

## 2020-10-20 RX ORDER — NALOXONE HYDROCHLORIDE 0.4 MG/ML
80 INJECTION, SOLUTION INTRAMUSCULAR; INTRAVENOUS; SUBCUTANEOUS AS NEEDED
Status: DISCONTINUED | OUTPATIENT
Start: 2020-10-20 | End: 2020-10-20 | Stop reason: HOSPADM

## 2020-10-20 RX ORDER — HYDROMORPHONE HYDROCHLORIDE 1 MG/ML
0.2 INJECTION, SOLUTION INTRAMUSCULAR; INTRAVENOUS; SUBCUTANEOUS EVERY 5 MIN PRN
Status: DISCONTINUED | OUTPATIENT
Start: 2020-10-20 | End: 2020-10-20 | Stop reason: HOSPADM

## 2020-10-20 RX ORDER — CYCLOBENZAPRINE HCL 5 MG
5 TABLET ORAL EVERY 8 HOURS PRN
Status: DISCONTINUED | OUTPATIENT
Start: 2020-10-20 | End: 2020-10-21

## 2020-10-20 RX ORDER — TRANEXAMIC ACID 10 MG/ML
1000 INJECTION, SOLUTION INTRAVENOUS ONCE
Status: COMPLETED | OUTPATIENT
Start: 2020-10-21 | End: 2020-10-21

## 2020-10-20 RX ORDER — OXYCODONE HYDROCHLORIDE 5 MG/1
10 TABLET ORAL ONCE
Status: COMPLETED | OUTPATIENT
Start: 2020-10-20 | End: 2020-10-20

## 2020-10-20 RX ORDER — ACETAMINOPHEN 500 MG
TABLET ORAL EVERY 6 HOURS PRN
Qty: 90 TABLET | Refills: 0 | Status: SHIPPED | OUTPATIENT
Start: 2020-10-20 | End: 2020-11-02

## 2020-10-20 RX ORDER — ACETAMINOPHEN 500 MG
1000 TABLET ORAL ONCE
Status: COMPLETED | OUTPATIENT
Start: 2020-10-20 | End: 2020-10-20

## 2020-10-20 RX ORDER — TRANEXAMIC ACID 10 MG/ML
INJECTION, SOLUTION INTRAVENOUS AS NEEDED
Status: DISCONTINUED | OUTPATIENT
Start: 2020-10-20 | End: 2020-10-20 | Stop reason: SURG

## 2020-10-20 RX ORDER — MORPHINE SULFATE 4 MG/ML
2 INJECTION, SOLUTION INTRAMUSCULAR; INTRAVENOUS EVERY 10 MIN PRN
Status: DISCONTINUED | OUTPATIENT
Start: 2020-10-20 | End: 2020-10-20 | Stop reason: HOSPADM

## 2020-10-20 RX ORDER — ONDANSETRON 2 MG/ML
4 INJECTION INTRAMUSCULAR; INTRAVENOUS EVERY 4 HOURS PRN
Status: DISCONTINUED | OUTPATIENT
Start: 2020-10-20 | End: 2020-10-21

## 2020-10-20 RX ORDER — ONDANSETRON 2 MG/ML
INJECTION INTRAMUSCULAR; INTRAVENOUS AS NEEDED
Status: DISCONTINUED | OUTPATIENT
Start: 2020-10-20 | End: 2020-10-20 | Stop reason: SURG

## 2020-10-20 RX ORDER — HYDROCODONE BITARTRATE AND ACETAMINOPHEN 5; 325 MG/1; MG/1
2 TABLET ORAL AS NEEDED
Status: DISCONTINUED | OUTPATIENT
Start: 2020-10-20 | End: 2020-10-20 | Stop reason: HOSPADM

## 2020-10-20 RX ORDER — DEXAMETHASONE SODIUM PHOSPHATE 4 MG/ML
VIAL (ML) INJECTION AS NEEDED
Status: DISCONTINUED | OUTPATIENT
Start: 2020-10-20 | End: 2020-10-20 | Stop reason: SURG

## 2020-10-20 RX ORDER — DOCUSATE SODIUM 100 MG/1
100 CAPSULE, LIQUID FILLED ORAL 2 TIMES DAILY
Status: DISCONTINUED | OUTPATIENT
Start: 2020-10-20 | End: 2020-10-21

## 2020-10-20 RX ORDER — MORPHINE SULFATE 4 MG/ML
4 INJECTION, SOLUTION INTRAMUSCULAR; INTRAVENOUS EVERY 10 MIN PRN
Status: DISCONTINUED | OUTPATIENT
Start: 2020-10-20 | End: 2020-10-20 | Stop reason: HOSPADM

## 2020-10-20 RX ORDER — OXYCODONE HYDROCHLORIDE AND ACETAMINOPHEN 5; 325 MG/1; MG/1
2 TABLET ORAL EVERY 6 HOURS PRN
Status: DISCONTINUED | OUTPATIENT
Start: 2020-10-20 | End: 2020-10-21

## 2020-10-20 RX ORDER — PROCHLORPERAZINE EDISYLATE 5 MG/ML
10 INJECTION INTRAMUSCULAR; INTRAVENOUS EVERY 6 HOURS PRN
Status: DISCONTINUED | OUTPATIENT
Start: 2020-10-20 | End: 2020-10-21

## 2020-10-20 RX ORDER — BISACODYL 10 MG
10 SUPPOSITORY, RECTAL RECTAL
Status: DISCONTINUED | OUTPATIENT
Start: 2020-10-20 | End: 2020-10-21

## 2020-10-20 RX ORDER — DIPHENHYDRAMINE HCL 25 MG
25 CAPSULE ORAL EVERY 4 HOURS PRN
Status: DISCONTINUED | OUTPATIENT
Start: 2020-10-20 | End: 2020-10-21

## 2020-10-20 RX ORDER — FAMOTIDINE 20 MG/1
20 TABLET ORAL ONCE
Status: COMPLETED | OUTPATIENT
Start: 2020-10-20 | End: 2020-10-20

## 2020-10-20 RX ORDER — SODIUM PHOSPHATE, DIBASIC AND SODIUM PHOSPHATE, MONOBASIC 7; 19 G/133ML; G/133ML
1 ENEMA RECTAL ONCE AS NEEDED
Status: DISCONTINUED | OUTPATIENT
Start: 2020-10-20 | End: 2020-10-21

## 2020-10-20 RX ORDER — HYDROMORPHONE HYDROCHLORIDE 1 MG/ML
0.4 INJECTION, SOLUTION INTRAMUSCULAR; INTRAVENOUS; SUBCUTANEOUS EVERY 5 MIN PRN
Status: DISCONTINUED | OUTPATIENT
Start: 2020-10-20 | End: 2020-10-20 | Stop reason: HOSPADM

## 2020-10-20 RX ORDER — MONTELUKAST SODIUM 10 MG/1
10 TABLET ORAL DAILY
Status: DISCONTINUED | OUTPATIENT
Start: 2020-10-21 | End: 2020-10-21

## 2020-10-20 RX ORDER — SODIUM CHLORIDE, SODIUM LACTATE, POTASSIUM CHLORIDE, CALCIUM CHLORIDE 600; 310; 30; 20 MG/100ML; MG/100ML; MG/100ML; MG/100ML
INJECTION, SOLUTION INTRAVENOUS CONTINUOUS
Status: DISCONTINUED | OUTPATIENT
Start: 2020-10-20 | End: 2020-10-21

## 2020-10-20 RX ORDER — MIDAZOLAM HYDROCHLORIDE 1 MG/ML
INJECTION INTRAMUSCULAR; INTRAVENOUS AS NEEDED
Status: DISCONTINUED | OUTPATIENT
Start: 2020-10-20 | End: 2020-10-20 | Stop reason: SURG

## 2020-10-20 RX ORDER — HALOPERIDOL 5 MG/ML
0.25 INJECTION INTRAMUSCULAR ONCE AS NEEDED
Status: DISCONTINUED | OUTPATIENT
Start: 2020-10-20 | End: 2020-10-20 | Stop reason: HOSPADM

## 2020-10-20 RX ORDER — HYDROCODONE BITARTRATE AND ACETAMINOPHEN 10; 325 MG/1; MG/1
1-2 TABLET ORAL EVERY 6 HOURS PRN
Status: DISCONTINUED | OUTPATIENT
Start: 2020-10-20 | End: 2020-10-20

## 2020-10-20 RX ORDER — PREGABALIN 75 MG/1
75 CAPSULE ORAL 2 TIMES DAILY
Status: DISCONTINUED | OUTPATIENT
Start: 2020-10-20 | End: 2020-10-21

## 2020-10-20 RX ORDER — LIDOCAINE HYDROCHLORIDE 10 MG/ML
INJECTION, SOLUTION EPIDURAL; INFILTRATION; INTRACAUDAL; PERINEURAL AS NEEDED
Status: DISCONTINUED | OUTPATIENT
Start: 2020-10-20 | End: 2020-10-20 | Stop reason: SURG

## 2020-10-20 RX ORDER — ALBUTEROL SULFATE 90 UG/1
1 AEROSOL, METERED RESPIRATORY (INHALATION) EVERY 6 HOURS PRN
Status: DISCONTINUED | OUTPATIENT
Start: 2020-10-20 | End: 2020-10-21

## 2020-10-20 RX ORDER — ONDANSETRON 4 MG/1
4 TABLET, FILM COATED ORAL EVERY 8 HOURS PRN
Qty: 30 TABLET | Refills: 0 | Status: SHIPPED | OUTPATIENT
Start: 2020-10-20 | End: 2020-12-18

## 2020-10-20 RX ORDER — SODIUM CHLORIDE, SODIUM LACTATE, POTASSIUM CHLORIDE, CALCIUM CHLORIDE 600; 310; 30; 20 MG/100ML; MG/100ML; MG/100ML; MG/100ML
INJECTION, SOLUTION INTRAVENOUS CONTINUOUS
Status: DISCONTINUED | OUTPATIENT
Start: 2020-10-20 | End: 2020-10-20 | Stop reason: ALTCHOICE

## 2020-10-20 RX ORDER — FAMOTIDINE 10 MG/ML
20 INJECTION, SOLUTION INTRAVENOUS 2 TIMES DAILY
Status: DISCONTINUED | OUTPATIENT
Start: 2020-10-20 | End: 2020-10-21

## 2020-10-20 RX ADMIN — TRANEXAMIC ACID 1000 MG: 10 INJECTION, SOLUTION INTRAVENOUS at 12:34:00

## 2020-10-20 RX ADMIN — LIDOCAINE HYDROCHLORIDE 5 MG: 10 INJECTION, SOLUTION EPIDURAL; INFILTRATION; INTRACAUDAL; PERINEURAL at 12:00:00

## 2020-10-20 RX ADMIN — SODIUM CHLORIDE: 9 INJECTION, SOLUTION INTRAVENOUS at 12:20:00

## 2020-10-20 RX ADMIN — LIDOCAINE HYDROCHLORIDE 50 MG: 10 INJECTION, SOLUTION EPIDURAL; INFILTRATION; INTRACAUDAL; PERINEURAL at 12:14:00

## 2020-10-20 RX ADMIN — MIDAZOLAM HYDROCHLORIDE 2 MG: 1 INJECTION INTRAMUSCULAR; INTRAVENOUS at 11:53:00

## 2020-10-20 RX ADMIN — SODIUM CHLORIDE, SODIUM LACTATE, POTASSIUM CHLORIDE, CALCIUM CHLORIDE: 600; 310; 30; 20 INJECTION, SOLUTION INTRAVENOUS at 14:06:00

## 2020-10-20 RX ADMIN — ONDANSETRON 4 MG: 2 INJECTION INTRAMUSCULAR; INTRAVENOUS at 12:14:00

## 2020-10-20 RX ADMIN — LIDOCAINE HYDROCHLORIDE 5 MG: 10 INJECTION, SOLUTION EPIDURAL; INFILTRATION; INTRACAUDAL; PERINEURAL at 12:05:00

## 2020-10-20 RX ADMIN — DEXAMETHASONE SODIUM PHOSPHATE 4 MG: 4 MG/ML VIAL (ML) INJECTION at 12:14:00

## 2020-10-20 RX ADMIN — LIDOCAINE HYDROCHLORIDE 5 MG: 10 INJECTION, SOLUTION EPIDURAL; INFILTRATION; INTRACAUDAL; PERINEURAL at 12:09:00

## 2020-10-20 RX ADMIN — MIDAZOLAM HYDROCHLORIDE 2 MG: 1 INJECTION INTRAMUSCULAR; INTRAVENOUS at 12:00:00

## 2020-10-20 RX ADMIN — BUPIVACAINE HYDROCHLORIDE 1.6 ML: 7.5 INJECTION, SOLUTION INTRASPINAL at 12:12:00

## 2020-10-20 NOTE — CHRONIC PAIN
[facility]  Anesthesiology Pain Management Progress Note      Patient name: Irving Arteaga 63year old female  : 1965  MRN: [id number]    Diagnosis: [unfilled]    Reason for Consult: postop pain    Current hospital day: CY MANZANO

## 2020-10-20 NOTE — ANESTHESIA PROCEDURE NOTES
Spinal Block  Performed by: James Aceves CRNA  Authorized by: Vicky Hernandez DO       General Information and Staff    Start Time:  10/20/2020 12:00 PM  End Time:  10/20/2020 12:12 PM  Anesthesiologist:  Vicky Hernandez DO  Performed by:   Anesth

## 2020-10-20 NOTE — ANESTHESIA PREPROCEDURE EVALUATION
Anesthesia PreOp Note    HPI:     Anitra Kenny is a 63year old female who presents for preoperative consultation requested by: Ernie Velázquez MD    Date of Surgery: 10/20/2020    Procedure(s):  HIP TOTAL REPLACEMENT  Indication: right hip degen HYDROcodone-acetaminophen  MG Oral Tab, Take 1 tablet by mouth 4 (four) times daily. , Disp: , Rfl:     •  PREGABALIN 75 MG Oral Cap, TAKE ONE CAPSULE BY MOUTH TWICE DAILY.  GENERIC EQUIVALENT FOR LYRICA, Disp: 180 capsule, Rfl: 0    •  CELECOXIB 200 M INJECT 1.8MG SUBCUTANEOUSLY DAILY, Disp: , Rfl: 1    •  clotrimazole-betamethasone 1-0.05 % External Cream, Apply 1 Application topically 2 (two) times daily. , Disp: 45 g, Rfl: 11    •  [DISCONTINUED] docusate sodium 100 MG Oral Cap, Take 1 capsule (100 mg Rfl: 0    •  docusate sodium 100 MG Oral Cap, Take 1 capsule (100 mg total) by mouth 2 (two) times daily. , Disp: 60 capsule, Rfl: 2    •  Ondansetron HCl (ZOFRAN) 4 mg tablet, Take 1 tablet (4 mg total) by mouth every 8 (eight) hours as needed for Nausea. , Forced sexual activity: Not on file    Other Topics      Concerns:        Not on file    Social History Narrative      Not on file      Available pre-op labs reviewed.   Lab Results   Component Value Date    WBC 9.35 10/12/2020    RBC 5.20 (H) 10/12/2020 my ability. The patient desires the anesthetic management as planned.   Akosua Valdivia  10/20/2020 10:37 AM

## 2020-10-20 NOTE — ANESTHESIA POSTPROCEDURE EVALUATION
Patient: Octavia Ballard    Procedure Summary     Date: 10/20/20 Room / Location: [address] [place] [address] MAIN OR 05 / [facility] OR    Anesthesia Start: 1312 Anesthesia Stop:     Procedure: HIP TOTAL REPLACEMENT (Right Hip) Diagnosis: (right hip degenerative joint dise

## 2020-10-20 NOTE — H&P
History and Physical: Brief Update    I have reviewed the history and physical performed within the last 30 days 9/30/20 Dr Nayan Willams as documented in Jasen. I agree with this assessment and have no further additions.   The consent form is signed and present i

## 2020-10-21 VITALS
WEIGHT: 293 LBS | HEART RATE: 62 BPM | HEIGHT: 65 IN | BODY MASS INDEX: 48.82 KG/M2 | OXYGEN SATURATION: 99 % | DIASTOLIC BLOOD PRESSURE: 70 MMHG | RESPIRATION RATE: 18 BRPM | SYSTOLIC BLOOD PRESSURE: 145 MMHG | TEMPERATURE: 99 F

## 2020-10-21 PROCEDURE — 97116 GAIT TRAINING THERAPY: CPT

## 2020-10-21 PROCEDURE — 82962 GLUCOSE BLOOD TEST: CPT

## 2020-10-21 PROCEDURE — 97161 PT EVAL LOW COMPLEX 20 MIN: CPT

## 2020-10-21 PROCEDURE — 97166 OT EVAL MOD COMPLEX 45 MIN: CPT

## 2020-10-21 RX ORDER — ACETAMINOPHEN 500 MG
TABLET ORAL EVERY 6 HOURS PRN
Qty: 90 TABLET | Refills: 0 | Status: SHIPPED | OUTPATIENT
Start: 2020-10-21 | End: 2021-03-22

## 2020-10-21 RX ORDER — HYDRALAZINE HYDROCHLORIDE 25 MG/1
25 TABLET, FILM COATED ORAL EVERY 8 HOURS PRN
Status: DISCONTINUED | OUTPATIENT
Start: 2020-10-21 | End: 2020-10-21

## 2020-10-21 RX ORDER — ASPIRIN 325 MG
325 TABLET, DELAYED RELEASE (ENTERIC COATED) ORAL 2 TIMES DAILY
Qty: 60 TABLET | Refills: 0 | Status: SHIPPED | OUTPATIENT
Start: 2020-10-21 | End: 2021-03-22

## 2020-10-21 RX ORDER — ONDANSETRON 4 MG/1
4 TABLET, FILM COATED ORAL EVERY 8 HOURS PRN
Qty: 30 TABLET | Refills: 0 | Status: SHIPPED | OUTPATIENT
Start: 2020-10-21

## 2020-10-21 RX ORDER — CEFADROXIL 500 MG/1
500 CAPSULE ORAL 2 TIMES DAILY
Qty: 14 CAPSULE | Refills: 0 | Status: SHIPPED | OUTPATIENT
Start: 2020-10-21 | End: 2020-10-28

## 2020-10-21 RX ORDER — DOCUSATE SODIUM 100 MG/1
100 CAPSULE, LIQUID FILLED ORAL 2 TIMES DAILY
Qty: 60 CAPSULE | Refills: 2 | Status: SHIPPED | OUTPATIENT
Start: 2020-10-21

## 2020-10-21 NOTE — PROGRESS NOTES
[facility]    Progress Note    Gela Kenyon Patient Status:  Outpatient in a Bed    1965 MRN [id number]   Location [facility] 4W/SW/SE Attending Misti Sesay MD   Hosp Day # 0 PCP MD Ronda Garza Francis Peng MD on 10/20/2020 at 3:34 PM                        JEFFREY Ahmadi  10/21/2020    TOMAS AhmadiC  Physician Assistant for Dr. Harpreet Saul: [phone]  F: [phone]  C: [phone]

## 2020-10-21 NOTE — PLAN OF CARE
Pt received AOx4 resting in bed, reports no pain, VSS, RA, able to ambulate to the bathroom with walker and 1 assist, plan to go home tomorrow, will continue to monitor.      Problem: Patient Centered Care  Goal: Patient preferences are identified and integ FALL  Goal: Free from fall injury  Description: INTERVENTIONS:  - Assess pt frequently for physical needs  - Identify cognitive and physical deficits and behaviors that affect risk of falls.   - [place] fall precautions as indicated by assessment.  - Educ patient to ask for assistance (call light)  - Provide an  as needed  - Communicate barriers and strategies to overcome with those who interact with patient  Outcome: Progressing     Problem: Patient/Family Goals  Goal: Patient/Family Long Term G

## 2020-10-21 NOTE — H&P
[facility] Hospitalist H&P       CC: No chief complaint on file.        PCP: Dyan Montez MD    History of Present Illness: 62 y/o w hx of asthma, diabetes, htn, hl here for  Rt hip ONESIMO, denies cp/sob, pain ok, no f/c/s, no cough      PMH  Past Medical Histor Disp: , Rfl:     •  HYDROcodone-acetaminophen  MG Oral Tab, Take 1 tablet by mouth 4 (four) times daily. , Disp: , Rfl:     •  PREGABALIN 75 MG Oral Cap, TAKE ONE CAPSULE BY MOUTH TWICE DAILY.  GENERIC EQUIVALENT FOR LYRICA, Disp: 180 capsule, Rfl: 0 Pen-injector, INJECT 1.8MG SUBCUTANEOUSLY DAILY, Disp: , Rfl: 1          Soc Hx  Social History    Tobacco Use      Smoking status: Never Smoker      Smokeless tobacco: Never Used    Alcohol use: Not Currently      Alcohol/week: 0.0 standard drinks      Co (lyx=[id number])    Result Date: 10/20/2020  CONCLUSION:  1. Bilateral hip prostheses in anatomic position. No adverse features 2. Recent postop changes following right hip arthroplasty.     Dictated by (CST): Jamal Peter MD on 10/20/2020 at 3:32 PM

## 2020-10-21 NOTE — OCCUPATIONAL THERAPY NOTE
OCCUPATIONAL THERAPY EVALUATION - INPATIENT     Room Number: 416/416-A  Evaluation Date: 10/21/2020  Type of Evaluation: Quick Eval  Presenting Problem: (OA fo R hip)    Physician Order: IP Consult to Occupational Therapy  Reason for Therapy: ADL/IADL Dy intubation     has been told small airway   • Esophageal reflux    • High blood pressure    • High cholesterol    • Migraines    • Obesity    • Osteoarthritis    • PONV (postoperative nausea and vomiting)        Past Surgical History  Past Surgical History Little  -   Putting on and taking off regular upper body clothing?: None  -   Taking care of personal grooming such as brushing teeth?: A Little  -   Eating meals?: None    AM-PAC Score:  Score: 20  Approx Degree of Impairment: 38.32%  Standardized Score (

## 2020-10-21 NOTE — PHYSICAL THERAPY NOTE
PHYSICAL THERAPY HIP EVALUATION - INPATIENT     Room Number: 811/234-G  Evaluation Date: 10/21/2020  Type of Evaluation: Initial  Physician Order: PT Eval and Treat    Presenting Problem: THR RLE posterior   Reason for Therapy: Mobility Dysfunction and Dis secondary to DJD     Problem List  Principal Problem:    Osteoarthritis of right hip  Active Problems:    History of hip replacement      Past Medical History  Past Medical History:   Diagnosis Date   • Asthma    • Diabetes ([address]    • Difficult intubation functional limits RLE 3/5, LLE 5/5    BALANCE  Static Sitting: Good  Dynamic Sitting: Fair +  Static Standing: Fair  Dynamic Standing: Fair -      AM-PAC '6-Clicks' INPATIENT SHORT FORM - BASIC MOBILITY  How much difficulty does the patient currently have. #1   Current Status    Goal #2 Patient is able to demonstrate transfers Sit to/from Stand at assistance level: modified independent     Goal #2  Current Status    Goal #3 Patient is able to ambulate 300 feet with assistive device at assistance level: modif

## 2020-10-21 NOTE — PLAN OF CARE
Pt A&Ox4. VSS. CMS (+). RA.  IS.  SCD/TR[place]ON. Gas (+). Up w/ walker X 1. Ancef IVABX. Pain managed w/ percocet Tolerating diet well w/ no N&V.     Problem: Patient Centered Care  Goal: Patient preferences are identified and integrated in the patient's sarah INTERVENTIONS:  - Assess pt frequently for physical needs  - Identify cognitive and physical deficits and behaviors that affect risk of falls.   - [place] fall precautions as indicated by assessment.  - Educate pt/family on patient safety including physic

## 2020-10-21 NOTE — CHRONIC PAIN
[facility]  Anesthesiology Pain Management Progress Note      Patient name: Heath Márquez 63year old female  : 1965  MRN: [id number]    Reason for Consult: Postoperative right hip pain    Current hospital day: Hospital Day CC     Continuous Infusions:  • lactated ringers 100 mL/hr at 10/20/20 1715     PRN Meds:.sodium chloride 0.9%, HYDROmorphone HCl **OR** HYDROmorphone HCl **OR** HYDROmorphone HCl, PEG 3350, magnesium hydroxide, bisacodyl, Fleet Enema, ondansetron HCl, Pro

## 2020-10-21 NOTE — PLAN OF CARE
Pt A&Ox4  VSS. CMS (+) and at baseline with pins and needles around her left knee that md is aware of and is taking lyrica. Pt tolerating hip pain  with intermittent ice packs and oxy 10 mg.  Pt denies nausea and vomiting and ambulating in room with walke Consider OT/PT consult to assist with strengthening/mobility  - Encourage toileting schedule  Outcome: Adequate for Discharge     Problem: DISCHARGE PLANNING  Goal: Discharge to home or other facility with appropriate resources  Description: INTERVENTIONS:

## 2020-10-21 NOTE — PHYSICAL THERAPY NOTE
PT PM note: Pt education with transfers and gait progression amb 150' with a RW with a step to gait pattern with SBA. Pt and spouse education with transfers and amb for SBA / CGA as needed with a RW.  All posterior THR precautions reviewed with pt and spous

## 2020-10-21 NOTE — CM/SW NOTE
SW received MDO for s/p joint. Pt has been screened per chart review and during conversation with the RN. Pt has no identified needs at this time. Pt is planning on doing Outpt PT. SW/CM will remain available for support and any discharge needs.      PLAN:

## 2020-10-25 NOTE — OPERATIVE REPORT
[address] [place] [address] 481, [address]  OPERATIVE REPORT  PATIENT NAME: Jayde Young  MR#: [id number]  ATTENDING MD: Cynthia Del Angel  DATE OF PROCEDURE: 10/20/2020  PREOPERATIVE DIAGNOSIS: Degenerative Arthritis R age. A cementless acetabular and femoral component was able to be placed in stable position. PROCEDURE: The patient was admitted to the same day surgery program on Tuesday, October 20, 2020.  Following proper identification in the preoperative holding area Multi-Hole P2 Coated Hemispherical Shell acetabular component was placed in 45 degrees of verticality and 20 degrees of anteversion. This was seated and gave excellent initial stability. 2 Screws were used.  Subsequently, a 56/40 polyethylene liner was inse 50.08. Due to this significant morbid obesity the surgical procedure was longer than our standard procedure. Special retractors were required for surgery and the surgical difficulty was tremendously increased due to the patients' morbid obesity.  Additional

## 2021-03-22 PROBLEM — K21.9 LARYNGOPHARYNGEAL REFLUX (LPR): Status: RESOLVED | Noted: 2018-06-18 | Resolved: 2021-03-22

## 2021-03-22 PROBLEM — M16.11 OSTEOARTHRITIS OF RIGHT HIP: Status: RESOLVED | Noted: 2020-10-14 | Resolved: 2021-03-22

## 2021-06-11 PROBLEM — R21 RASH OF FOOT: Status: ACTIVE | Noted: 2021-06-11

## 2021-06-11 PROBLEM — Z96.649 HISTORY OF HIP REPLACEMENT: Status: RESOLVED | Noted: 2020-10-20 | Resolved: 2021-06-11

## 2021-06-11 PROBLEM — Z96.642 S/P HIP REPLACEMENT, LEFT: Status: RESOLVED | Noted: 2020-06-01 | Resolved: 2021-06-11

## 2021-07-09 PROBLEM — Z98.890 H/O LUMBOSACRAL SPINE SURGERY: Status: ACTIVE | Noted: 2021-06-15

## 2021-07-09 PROBLEM — R73.03 PRE-DIABETES: Status: ACTIVE | Noted: 2021-07-09

## 2021-07-09 PROBLEM — R73.9 STRESS HYPERGLYCEMIA: Status: ACTIVE | Noted: 2021-06-19

## 2021-10-14 PROBLEM — M17.10 OSTEOARTHRITIS OF KNEE: Status: ACTIVE | Noted: 2021-10-14

## 2021-10-14 PROBLEM — M51.26 DISPLACEMENT OF LUMBAR INTERVERTEBRAL DISC WITHOUT MYELOPATHY: Status: ACTIVE | Noted: 2021-10-14

## (undated) DEVICE — ANTIBACTERIAL UNDYED BRAIDED (POLYGLACTIN 910), SYNTHETIC ABSORBABLE SUTURE: Brand: COATED VICRYL

## (undated) DEVICE — TOWEL OR BLU 16X26 STRL

## (undated) DEVICE — GAUZE SPONGES,12 PLY: Brand: CURITY

## (undated) DEVICE — DRAPE SRG 70X60IN SPLT U IMPRV

## (undated) DEVICE — SOL  .9 3000ML

## (undated) DEVICE — T5 HOOD WITH PEEL AWAY FACE SHIELD

## (undated) DEVICE — GAMMEX® PI HYBRID SIZE 8, STERILE POWDER-FREE SURGICAL GLOVE, POLYISOPRENE AND NEOPRENE BLEND: Brand: GAMMEX

## (undated) DEVICE — 3M™ IOBAN™ 2 ANTIMICROBIAL INCISE DRAPE 6650EZ: Brand: IOBAN™ 2

## (undated) DEVICE — SUTURE MONOCRYL 3-0 Y936H

## (undated) DEVICE — SHEET,DRAPE,70X100,STERILE: Brand: MEDLINE

## (undated) DEVICE — CONTAINER SPEC STR 4OZ GRY LID

## (undated) DEVICE — 60 ML SYRINGE LUER-LOCK TIP: Brand: MONOJECT

## (undated) DEVICE — DRESSING AQUACEL AG SP 3.5X10

## (undated) DEVICE — PEN: MARKING STD PT 100/CS: Brand: MEDICAL ACTION INDUSTRIES

## (undated) DEVICE — BIT DRL 30MM 3.2MM RNGLC ACTB

## (undated) DEVICE — PACK CDS TOTAL HIP

## (undated) DEVICE — SOL  .9 1000ML BTL

## (undated) DEVICE — CHLORAPREP 26ML APPLICATOR

## (undated) DEVICE — ENCORE® LATEX ACCLAIM SIZE 7.5, STERILE LATEX POWDER-FREE SURGICAL GLOVE: Brand: ENCORE

## (undated) DEVICE — SPINOCAN® 18 GA. X 3-1/2 IN. (90 MM) SPINAL NEEDLE: Brand: SPINOCAN®

## (undated) DEVICE — GAMMEX® PI HYBRID SIZE 7.5, STERILE POWDER-FREE SURGICAL GLOVE, POLYISOPRENE AND NEOPRENE BLEND: Brand: GAMMEX

## (undated) DEVICE — SYRINGE MNJCT 35ML LF STRL LL

## (undated) DEVICE — PILLOW ABDUCTION HIP MED

## (undated) DEVICE — PILLOW FX 56X38X15CM MED HIP

## (undated) DEVICE — ENCORE® LATEX MICRO SIZE 8, STERILE LATEX POWDER-FREE SURGICAL GLOVE: Brand: ENCORE

## (undated) DEVICE — BONE WAX W31G

## (undated) DEVICE — ANTIBACTERIAL VIOLET BRAIDED (POLYGLACTIN 910), SYNTHETIC ABSORBABLE SUTURE: Brand: COATED VICRYL

## (undated) DEVICE — Device: Brand: STABLECUT®

## (undated) DEVICE — BLADE ELECTRODE: Brand: EDGE

## (undated) DEVICE — HOOD: Brand: FLYTE

## (undated) DEVICE — ENCORE® PERRY STYLE 42 PF SIZE 7.5, STERILE LATEX POWDER-FREE SURGICAL GLOVE: Brand: ENCORE

## (undated) DEVICE — STANDARD HYPODERMIC NEEDLE,POLYPROPYLENE HUB: Brand: MONOJECT

## (undated) DEVICE — BATTERY

## (undated) DEVICE — GAMMEX® PI HYBRID SIZE 6.5, STERILE POWDER-FREE SURGICAL GLOVE, POLYISOPRENE AND NEOPRENE BLEND: Brand: GAMMEX

## (undated) DEVICE — 1010 S-DRAPE TOWEL DRAPE 10/BX: Brand: STERI-DRAPE™